# Patient Record
Sex: FEMALE | Race: WHITE | NOT HISPANIC OR LATINO | URBAN - METROPOLITAN AREA
[De-identification: names, ages, dates, MRNs, and addresses within clinical notes are randomized per-mention and may not be internally consistent; named-entity substitution may affect disease eponyms.]

---

## 2020-12-14 ENCOUNTER — OFFICE VISIT (OUTPATIENT)
Dept: URBAN - METROPOLITAN AREA CLINIC 54 | Facility: CLINIC | Age: 70
End: 2020-12-14
Payer: MEDICARE

## 2020-12-14 PROCEDURE — 92134 CPTRZ OPH DX IMG PST SGM RTA: CPT | Performed by: OPHTHALMOLOGY

## 2020-12-14 PROCEDURE — 92012 INTRM OPH EXAM EST PATIENT: CPT | Performed by: OPHTHALMOLOGY

## 2020-12-14 ASSESSMENT — INTRAOCULAR PRESSURE
OS: 17
OD: 17

## 2020-12-14 NOTE — IMPRESSION/PLAN
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 Plan: Last treated with an Avastin injection OU 14 weeks ago. Exam/OCT reveals no IRF/SRF/SRH OU. Recommend continuing treatment to maintain the NV AMD.  Avastin administered OU.   

Return in 10 weeks, OCT OU, re-eval Avastin OU

## 2021-03-08 ENCOUNTER — OFFICE VISIT (OUTPATIENT)
Dept: URBAN - METROPOLITAN AREA CLINIC 54 | Facility: CLINIC | Age: 71
End: 2021-03-08
Payer: MEDICARE

## 2021-03-08 PROCEDURE — 92134 CPTRZ OPH DX IMG PST SGM RTA: CPT | Performed by: OPHTHALMOLOGY

## 2021-03-08 PROCEDURE — 92014 COMPRE OPH EXAM EST PT 1/>: CPT | Performed by: OPHTHALMOLOGY

## 2021-03-08 ASSESSMENT — INTRAOCULAR PRESSURE
OS: 14
OD: 20

## 2021-03-08 NOTE — IMPRESSION/PLAN
Impression: Cataract: H26.9. OD. Right. Plan: Follow up with Dr. Rajan Mcneill for continued monitoring and treatment.

## 2021-03-08 NOTE — IMPRESSION/PLAN
Impression: Exudative age-rel mclr degn, bi, with inact chrdl neovas: H35.0512. Plan: Last treated with an Avastin injection OU 12 weeks ago. Exam/OCT reveals no IRF/SRF/SRH OU. Recommend continuing treatment to maintain the NV AMD.  Avastin administered OU.   

Return in 12 weeks, OCT OU, re-eval Avastin OU

## 2021-05-18 ENCOUNTER — OFFICE VISIT (OUTPATIENT)
Dept: URBAN - METROPOLITAN AREA CLINIC 54 | Facility: CLINIC | Age: 71
End: 2021-05-18
Payer: MEDICARE

## 2021-05-18 DIAGNOSIS — H35.3232 EXUDATIVE AGE-RELATED MACULAR DEGENERATION, BILATERAL, WITH INACTIVE CHOROIDAL NEOVASCULARIZATION: Primary | ICD-10-CM

## 2021-05-18 DIAGNOSIS — H35.3231 EXUDATIVE AGE-RELATED MACULAR DEGENERATION, BILATERAL, WITH ACTIVE CHOROIDAL NEOVASCULARIZATION: ICD-10-CM

## 2021-05-18 PROCEDURE — 92134 CPTRZ OPH DX IMG PST SGM RTA: CPT | Performed by: OPHTHALMOLOGY

## 2021-05-18 ASSESSMENT — INTRAOCULAR PRESSURE
OD: 19
OS: 18

## 2021-05-18 NOTE — IMPRESSION/PLAN
Impression: Cataract: H26.9. OD. Right. Plan: Follow up with Dr. David Sawant for continued monitoring and treatment.

## 2021-05-18 NOTE — IMPRESSION/PLAN
Impression: Exudative age-rel mclr degn, bi, with inact chrdl neovas: H35.3272. Plan: Last treated with an Avastin injection OU 10 weeks ago. Exam/OCT reveals no IRF/SRF/SRH OU. Recommend continuing treatment to maintain the NV AMD.  Avastin administered OU.   

Return in 12 weeks, OCT OU, re-eval Avastin OU

## 2021-08-10 ENCOUNTER — OFFICE VISIT (OUTPATIENT)
Dept: URBAN - METROPOLITAN AREA CLINIC 54 | Facility: CLINIC | Age: 71
End: 2021-08-10
Payer: MEDICARE

## 2021-08-10 DIAGNOSIS — Z96.1 PRESENCE OF PSEUDOPHAKIA: ICD-10-CM

## 2021-08-10 DIAGNOSIS — H26.9 CATARACT: ICD-10-CM

## 2021-08-10 DIAGNOSIS — H26.492 OTHER SECONDARY CATARACT, LEFT EYE: ICD-10-CM

## 2021-08-10 PROCEDURE — 92014 COMPRE OPH EXAM EST PT 1/>: CPT | Performed by: OPHTHALMOLOGY

## 2021-08-10 PROCEDURE — 92134 CPTRZ OPH DX IMG PST SGM RTA: CPT | Performed by: OPHTHALMOLOGY

## 2021-08-10 ASSESSMENT — INTRAOCULAR PRESSURE
OD: 17
OS: 17

## 2021-08-10 NOTE — IMPRESSION/PLAN
Impression: Cataract: H26.9. OD. Right. Plan: Follow up with Dr. Kerri Silva for continued monitoring and treatment.

## 2021-08-10 NOTE — IMPRESSION/PLAN
Impression: Exudative age-rel mclr degn, bi, with inact chrdl neovas: H35.5732. Plan: Last treated with an Avastin injection OU 12 weeks ago. Exam/OCT reveals no IRF/SRF/SRH OU. Recommend continuing treatment to maintain the NV AMD.  Avastin administered OU.   

Return in 12 weeks, OCT OU, re-eval Avastin OU

## 2021-10-26 ENCOUNTER — OFFICE VISIT (OUTPATIENT)
Dept: URBAN - METROPOLITAN AREA CLINIC 54 | Facility: CLINIC | Age: 71
End: 2021-10-26
Payer: MEDICARE

## 2021-10-26 PROCEDURE — 92014 COMPRE OPH EXAM EST PT 1/>: CPT | Performed by: OPHTHALMOLOGY

## 2021-10-26 PROCEDURE — 92134 CPTRZ OPH DX IMG PST SGM RTA: CPT | Performed by: OPHTHALMOLOGY

## 2021-10-26 ASSESSMENT — INTRAOCULAR PRESSURE
OD: 20
OS: 17

## 2021-10-26 NOTE — IMPRESSION/PLAN
Impression: Exudative age-rel mclr degn, bi, with inact chrdl neovas: H35.9176. Plan: Last treated with an Avastin injection OU 11 weeks ago. Exam/OCT reveals recurrent SRF OD and no IRF/SRF/SRH OS. Recommend continuing treatment to maintain the NV AMD.  Avastin administered OU. Will shorten treatment intervals to 6 weeks in OD. Return in 6 weeks, OCT OU, re-eval Avastin OD

## 2021-10-26 NOTE — IMPRESSION/PLAN
Impression: Cataract: H26.9. OD. Right. Plan: Follow up with Dr. Vickye Kehr for continued monitoring and treatment.

## 2021-12-06 ENCOUNTER — OFFICE VISIT (OUTPATIENT)
Dept: URBAN - METROPOLITAN AREA CLINIC 54 | Facility: CLINIC | Age: 71
End: 2021-12-06
Payer: MEDICARE

## 2021-12-06 PROCEDURE — 92134 CPTRZ OPH DX IMG PST SGM RTA: CPT | Performed by: OPHTHALMOLOGY

## 2021-12-06 ASSESSMENT — INTRAOCULAR PRESSURE
OD: 21
OS: 18

## 2021-12-06 NOTE — IMPRESSION/PLAN
Impression: Exudative age-rel mclr degn, bi, with inact chrdl neovas: H35.8002. Plan: Last treated with an Avastin injection OU 6 weeks ago. Exam/OCT reveals decrease SRF OD and no IRF/SRF/SRH OS. Recommend continuing treatment to maintain the NV AMD.  Avastin administered OU. Will extend treatment intervals to 7 weeks in OU. 

Return in 7 weeks, OCT OU, re-eval Avastin OD

## 2022-01-28 ENCOUNTER — OFFICE VISIT (OUTPATIENT)
Dept: URBAN - METROPOLITAN AREA CLINIC 54 | Facility: CLINIC | Age: 72
End: 2022-01-28
Payer: MEDICARE

## 2022-01-28 PROCEDURE — 92014 COMPRE OPH EXAM EST PT 1/>: CPT | Performed by: OPHTHALMOLOGY

## 2022-01-28 PROCEDURE — 92134 CPTRZ OPH DX IMG PST SGM RTA: CPT | Performed by: OPHTHALMOLOGY

## 2022-01-28 ASSESSMENT — INTRAOCULAR PRESSURE
OS: 18
OD: 21

## 2022-01-28 NOTE — IMPRESSION/PLAN
Impression: Exudative age-rel mclr degn, bi, with inact chrdl neovas: H35.0914. Plan: Last treated with an Avastin injection OU 7 weeks ago on 12/6/21. Exam/OCT reveals no IRF/SRF/SRH OU. Recommend continuing treatment to maintain the NV AMD.  Avastin administered OU. Will extend intervals to 8 weeks due to scheduling. 

Return in 8 weeks, OCT OU, re-eval Avastin OD

## 2022-03-25 ENCOUNTER — OFFICE VISIT (OUTPATIENT)
Dept: URBAN - METROPOLITAN AREA CLINIC 54 | Facility: CLINIC | Age: 72
End: 2022-03-25
Payer: MEDICARE

## 2022-03-25 PROCEDURE — 92134 CPTRZ OPH DX IMG PST SGM RTA: CPT | Performed by: OPHTHALMOLOGY

## 2022-03-25 ASSESSMENT — INTRAOCULAR PRESSURE
OS: 15
OD: 17

## 2022-03-25 NOTE — IMPRESSION/PLAN
Impression: Exudative age-rel mclr degn, bi, with inact chrdl neovas: H35.1832. Plan: Last treated with an Avastin injection OU 8 weeks ago on 1/28/22. Exam/OCT reveals decrease SRF OD and no IRF/SRF/SRH OS. Recommend continuing treatment to maintain the NV AMD.  Avastin administered OU.   

Return in 8 weeks, OCT OU, re-eval Avastin OU

## 2022-05-13 ENCOUNTER — OFFICE VISIT (OUTPATIENT)
Dept: URBAN - METROPOLITAN AREA CLINIC 54 | Facility: CLINIC | Age: 72
End: 2022-05-13
Payer: MEDICARE

## 2022-05-13 DIAGNOSIS — Z96.1 PRESENCE OF PSEUDOPHAKIA: ICD-10-CM

## 2022-05-13 DIAGNOSIS — H35.3232 EXUDATIVE AGE-RELATED MACULAR DEGENERATION, BILATERAL, WITH INACTIVE CHOROIDAL NEOVASCULARIZATION: Primary | ICD-10-CM

## 2022-05-13 PROCEDURE — 92134 CPTRZ OPH DX IMG PST SGM RTA: CPT | Performed by: OPHTHALMOLOGY

## 2022-05-13 PROCEDURE — 92014 COMPRE OPH EXAM EST PT 1/>: CPT | Performed by: OPHTHALMOLOGY

## 2022-05-13 ASSESSMENT — INTRAOCULAR PRESSURE
OS: 21
OD: 23

## 2022-05-13 NOTE — IMPRESSION/PLAN
Impression: Exudative age-rel mclr degn, bi, with inact chrdl neovas: H35.3072. Plan: Last treated with an Avastin injection OU 7 weeks ago on 3/25/22. Exam/OCT reveals no IRF/SRF/SRH OU. Recommend continuing treatment to maintain the NV AMD.  Avastin administered OU.   

Return in 8 weeks, OCT OU, re-eval Avastin OU

## 2022-07-06 ENCOUNTER — OFFICE VISIT (OUTPATIENT)
Dept: URBAN - METROPOLITAN AREA CLINIC 13 | Facility: CLINIC | Age: 72
End: 2022-07-06
Payer: MEDICARE

## 2022-07-06 DIAGNOSIS — H35.3232 EXUDATIVE AGE-RELATED MACULAR DEGENERATION, BILATERAL, WITH INACTIVE CHOROIDAL NEOVASCULARIZATION: Primary | ICD-10-CM

## 2022-07-06 DIAGNOSIS — Z96.1 PRESENCE OF PSEUDOPHAKIA: ICD-10-CM

## 2022-07-06 PROCEDURE — 99213 OFFICE O/P EST LOW 20 MIN: CPT | Performed by: OPHTHALMOLOGY

## 2022-07-06 PROCEDURE — 92134 CPTRZ OPH DX IMG PST SGM RTA: CPT | Performed by: OPHTHALMOLOGY

## 2022-07-06 ASSESSMENT — INTRAOCULAR PRESSURE
OS: 13
OD: 13

## 2022-07-06 NOTE — IMPRESSION/PLAN
Impression: Exudative age-rel mclr degn, bi, with inact chrdl neovas: H35.3992. Plan: Last treated with an Avastin injection OU 7 1/2 weeks ago on 5/13/22. Exam/OCT reveals no IRF/SRF/SRH OU. Recommend continuing treatment to maintain the NV AMD.  Avastin administered OU.   

Return in 8 weeks, OCT OU, re-eval Avastin OU

## 2022-08-31 ENCOUNTER — OFFICE VISIT (OUTPATIENT)
Dept: URBAN - METROPOLITAN AREA CLINIC 13 | Facility: CLINIC | Age: 72
End: 2022-08-31
Payer: MEDICARE

## 2022-08-31 DIAGNOSIS — Z96.1 PRESENCE OF PSEUDOPHAKIA: ICD-10-CM

## 2022-08-31 DIAGNOSIS — H26.9 CATARACT: ICD-10-CM

## 2022-08-31 DIAGNOSIS — H35.3232 EXUDATIVE AGE-RELATED MACULAR DEGENERATION, BILATERAL, WITH INACTIVE CHOROIDAL NEOVASCULARIZATION: Primary | ICD-10-CM

## 2022-08-31 PROCEDURE — 92134 CPTRZ OPH DX IMG PST SGM RTA: CPT | Performed by: OPHTHALMOLOGY

## 2022-08-31 PROCEDURE — 92014 COMPRE OPH EXAM EST PT 1/>: CPT | Performed by: OPHTHALMOLOGY

## 2022-08-31 ASSESSMENT — INTRAOCULAR PRESSURE
OS: 18
OD: 20

## 2022-08-31 NOTE — IMPRESSION/PLAN
Impression: Cataract: H26.9. Plan: No treatment is recommended at this time. Will continue to monitor. Non-visually significant. Observe.

## 2022-08-31 NOTE — IMPRESSION/PLAN
Impression: Exudative age-rel mclr degn, bi, with inact chrdl neovas: H35.3232. Plan: Last treated with an Avastin injection OU 8 weeks ago on 07/06/22. Exam/OCT reveals no IRF/SRF/SRH OU. Condition remains stable OU. Recommend continuing treatment to maintain the NV AMD.  Avastin administered OU.   

Return in 8 weeks, OCT OU, re-eval Avastin OU (8/31/22)

## 2022-10-26 ENCOUNTER — OFFICE VISIT (OUTPATIENT)
Dept: URBAN - METROPOLITAN AREA CLINIC 13 | Facility: CLINIC | Age: 72
End: 2022-10-26
Payer: MEDICARE

## 2022-10-26 DIAGNOSIS — H35.3232 EXUDATIVE AGE-RELATED MACULAR DEGENERATION, BILATERAL, WITH INACTIVE CHOROIDAL NEOVASCULARIZATION: Primary | ICD-10-CM

## 2022-10-26 DIAGNOSIS — H26.9 CATARACT: ICD-10-CM

## 2022-10-26 DIAGNOSIS — Z96.1 PRESENCE OF PSEUDOPHAKIA: ICD-10-CM

## 2022-10-26 PROCEDURE — 92134 CPTRZ OPH DX IMG PST SGM RTA: CPT | Performed by: OPHTHALMOLOGY

## 2022-10-26 ASSESSMENT — INTRAOCULAR PRESSURE
OS: 17
OD: 19

## 2022-10-26 NOTE — IMPRESSION/PLAN
Impression: Exudative age-rel mclr degn, bi, with inact chrdl neovas: H35.2902. Plan: Last treated with an Avastin injection OU 8 weeks ago on 8/31/22. Exam/OCT reveals no IRF/SRF/SRH OU. Condition remains stable OU. Recommend continuing treatment to maintain the NV AMD.  Avastin administered OU.   

Return in 8 weeks, OCT OU, re-eval Avastin OU (8/31/22)

## 2023-01-10 ENCOUNTER — OFFICE VISIT (OUTPATIENT)
Dept: URBAN - METROPOLITAN AREA CLINIC 54 | Facility: CLINIC | Age: 73
End: 2023-01-10
Payer: MEDICARE

## 2023-01-10 DIAGNOSIS — H35.3232 EXUDATIVE AGE-RELATED MACULAR DEGENERATION, BILATERAL, WITH INACTIVE CHOROIDAL NEOVASCULARIZATION: Primary | ICD-10-CM

## 2023-01-10 DIAGNOSIS — H26.9 CATARACT: ICD-10-CM

## 2023-01-10 DIAGNOSIS — Z96.1 PRESENCE OF PSEUDOPHAKIA: ICD-10-CM

## 2023-01-10 PROCEDURE — 92134 CPTRZ OPH DX IMG PST SGM RTA: CPT | Performed by: OPHTHALMOLOGY

## 2023-01-10 PROCEDURE — 92014 COMPRE OPH EXAM EST PT 1/>: CPT | Performed by: OPHTHALMOLOGY

## 2023-01-10 ASSESSMENT — INTRAOCULAR PRESSURE
OD: 20
OS: 15

## 2023-01-10 NOTE — IMPRESSION/PLAN
Impression: Exudative age-rel mclr degn, bi, with inact chrdl neovas: H35.5352. Plan: Last treated with an Avastin injection OU 11 weeks ago on 10/26/22. Exam/OCT reveals no IRF/SRF/SRH OU. Condition remains stable OU. Recommend continuing treatment to maintain the NV AMD.  Avastin administered OU.   

Return in 8 weeks, OCT OU, re-eval Avastin OU (8/31/22)

## 2023-01-10 NOTE — IMPRESSION/PLAN
Impression: Cataract: H26.9. Right. Plan: No treatment is recommended at this time. Will continue to monitor. Non-visually significant. Observe.

## 2023-03-06 ENCOUNTER — OFFICE VISIT (OUTPATIENT)
Dept: URBAN - METROPOLITAN AREA CLINIC 54 | Facility: CLINIC | Age: 73
End: 2023-03-06
Payer: MEDICARE

## 2023-03-06 DIAGNOSIS — H26.9 CATARACT: ICD-10-CM

## 2023-03-06 DIAGNOSIS — H35.3232 EXUDATIVE AGE-RELATED MACULAR DEGENERATION, BILATERAL, WITH INACTIVE CHOROIDAL NEOVASCULARIZATION: Primary | ICD-10-CM

## 2023-03-06 DIAGNOSIS — Z96.1 PRESENCE OF PSEUDOPHAKIA: ICD-10-CM

## 2023-03-06 PROCEDURE — 92134 CPTRZ OPH DX IMG PST SGM RTA: CPT | Performed by: OPHTHALMOLOGY

## 2023-03-06 PROCEDURE — 99214 OFFICE O/P EST MOD 30 MIN: CPT | Performed by: OPHTHALMOLOGY

## 2023-03-06 ASSESSMENT — INTRAOCULAR PRESSURE
OD: 21
OS: 21

## 2023-03-06 NOTE — IMPRESSION/PLAN
Impression: Exudative age-rel mclr degn, bi, with inact chrdl neovas: H35.6842. Plan: Last treated with an Avastin injection OU 8 weeks ago on 1/10/23. Exam/OCT reveals no IRF/SRF/SRH OU. Condition remains stable OU. Recommend continuing treatment to maintain the NV AMD.  Avastin administered OU.   

Return in 8 weeks, OCT OU, re-eval Avastin OU (8/31/22)

## 2023-05-01 ENCOUNTER — OFFICE VISIT (OUTPATIENT)
Dept: URBAN - METROPOLITAN AREA CLINIC 54 | Facility: CLINIC | Age: 73
End: 2023-05-01
Payer: MEDICARE

## 2023-05-01 DIAGNOSIS — H26.9 CATARACT: ICD-10-CM

## 2023-05-01 DIAGNOSIS — H35.3232 EXUDATIVE AGE-RELATED MACULAR DEGENERATION, BILATERAL, WITH INACTIVE CHOROIDAL NEOVASCULARIZATION: Primary | ICD-10-CM

## 2023-05-01 DIAGNOSIS — Z96.1 PRESENCE OF PSEUDOPHAKIA: ICD-10-CM

## 2023-05-01 DIAGNOSIS — H35.3114 NEXDTVE AGE-REL MCLR DEGN, R EYE, ADV ATRPC WITH SBFVL INVL: ICD-10-CM

## 2023-05-01 PROCEDURE — 92134 CPTRZ OPH DX IMG PST SGM RTA: CPT | Performed by: OPHTHALMOLOGY

## 2023-05-01 PROCEDURE — 99213 OFFICE O/P EST LOW 20 MIN: CPT | Performed by: OPHTHALMOLOGY

## 2023-05-01 ASSESSMENT — INTRAOCULAR PRESSURE
OD: 18
OS: 15

## 2023-05-01 NOTE — IMPRESSION/PLAN
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 Discussed treatment with Syfovre including risks and benefits of treatment. Patient elects to proceed with treatment. Will schedule Syfovre treatments every 8 weeks in between treatments for the NV AMD OD.

## 2023-05-01 NOTE — IMPRESSION/PLAN
Impression: Cataract: H26.9. Right. Plan: No treatment is recommended at this time. Will continue to monitor.

## 2023-05-01 NOTE — IMPRESSION/PLAN
Impression: Exudative age-rel mclr degn, bi, with inact chrdl neovas: H35.3232. Plan: Last treated with an Avastin injection OU 8 weeks ago on 3/6/23. Exam/OCT reveals no IRF/SRF/SRH OU. Condition remains stable OU. Recommend continuing treatment to maintain the NV AMD.  Avastin administered OU. Return in 4 weeks, FAF OU, OCT OU, re-eval Syfovre OU Return in 8 weeks, OCT OU, re-eval Avastin OU

## 2023-06-06 ENCOUNTER — OFFICE VISIT (OUTPATIENT)
Dept: URBAN - METROPOLITAN AREA CLINIC 54 | Facility: CLINIC | Age: 73
End: 2023-06-06
Payer: MEDICARE

## 2023-06-06 DIAGNOSIS — H35.3232 EXUDATIVE AGE-REL MCLR DEGN, BI, WITH INACT CHRDL NEOVAS: ICD-10-CM

## 2023-06-06 DIAGNOSIS — Z96.1 PRESENCE OF PSEUDOPHAKIA: ICD-10-CM

## 2023-06-06 DIAGNOSIS — H35.3114 NONEXUDATIVE AGE-RELATED MACULAR DEGENERATION, RIGHT EYE, ADVANCED ATROPHIC WITH SUBFOVEAL INVOLVEMENT: Primary | ICD-10-CM

## 2023-06-06 PROCEDURE — 67028 INJECTION EYE DRUG: CPT | Performed by: OPHTHALMOLOGY

## 2023-06-06 PROCEDURE — 92134 CPTRZ OPH DX IMG PST SGM RTA: CPT | Performed by: OPHTHALMOLOGY

## 2023-06-06 ASSESSMENT — INTRAOCULAR PRESSURE
OS: 21
OD: 28

## 2023-06-06 NOTE — IMPRESSION/PLAN
Impression: Nexdtve age-rel mclr degn, r eye, adv atrpc with sbfvl invl: [id number]. Right. 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 Discussed treatment with Syfovre including risks and benefits of treatment. Patient elects to proceed with treatment. Will schedule Syfovre treatments every 8 weeks in between treatments for the NV AMD OD. Syfovre administered OD. Return in 3-4 weeks, OCT OU, Avastin OU

## 2023-06-06 NOTE — IMPRESSION/PLAN
Impression: Exudative age-rel mclr degn, bi, with inact chrdl neovas: H35.3232. Plan: Last treated with an Avastin injection OU 5 weeks ago on 5/1/23. Exam/OCT reveals no IRF/SRF/SRH OU. Condition remains stable OU. Recommend continuing treatment to maintain the NV AMD. Return as scheduled for next Avastin treatment.

## 2023-06-26 ENCOUNTER — PROCEDURE (OUTPATIENT)
Dept: URBAN - METROPOLITAN AREA CLINIC 7 | Facility: CLINIC | Age: 73
End: 2023-06-26
Payer: MEDICARE

## 2023-06-26 DIAGNOSIS — H35.3232 EXUDATIVE AGE-RELATED MACULAR DEGENERATION, BILATERAL, WITH INACTIVE CHOROIDAL NEOVASCULARIZATION: Primary | ICD-10-CM

## 2023-06-26 PROCEDURE — 92134 CPTRZ OPH DX IMG PST SGM RTA: CPT | Performed by: OPHTHALMOLOGY

## 2023-06-26 ASSESSMENT — INTRAOCULAR PRESSURE
OD: 21
OS: 19

## 2023-07-25 ENCOUNTER — OFFICE VISIT (OUTPATIENT)
Dept: URBAN - METROPOLITAN AREA CLINIC 54 | Facility: CLINIC | Age: 73
End: 2023-07-25
Payer: MEDICARE

## 2023-07-25 DIAGNOSIS — H35.3114 NEXDTVE AGE-REL MCLR DEGN, R EYE, ADV ATRPC WITH SBFVL INVL: Primary | ICD-10-CM

## 2023-07-25 DIAGNOSIS — Z96.1 PRESENCE OF PSEUDOPHAKIA: ICD-10-CM

## 2023-07-25 DIAGNOSIS — H26.9 CATARACT: ICD-10-CM

## 2023-07-25 DIAGNOSIS — H16.129 FILAMENTARY KERATITIS OF EYE: ICD-10-CM

## 2023-07-25 DIAGNOSIS — H35.3232 EXUDATIVE AGE-RELATED MACULAR DEGENERATION, BILATERAL, WITH INACTIVE CHOROIDAL NEOVASCULARIZATION: ICD-10-CM

## 2023-07-25 PROCEDURE — 65222 REMOVE FOREIGN BODY FROM EYE: CPT | Performed by: OPHTHALMOLOGY

## 2023-07-25 PROCEDURE — 99213 OFFICE O/P EST LOW 20 MIN: CPT | Performed by: OPHTHALMOLOGY

## 2023-07-25 PROCEDURE — 92134 CPTRZ OPH DX IMG PST SGM RTA: CPT | Performed by: OPHTHALMOLOGY

## 2023-07-25 ASSESSMENT — INTRAOCULAR PRESSURE
OD: 14
OS: 12

## 2023-08-23 ENCOUNTER — PROCEDURE (OUTPATIENT)
Dept: URBAN - METROPOLITAN AREA CLINIC 13 | Facility: CLINIC | Age: 73
End: 2023-08-23
Payer: MEDICARE

## 2023-08-23 DIAGNOSIS — H35.3232 EXUDATIVE AGE-RELATED MACULAR DEGENERATION, BILATERAL, WITH INACTIVE CHOROIDAL NEOVASCULARIZATION: Primary | ICD-10-CM

## 2023-08-23 PROCEDURE — 92134 CPTRZ OPH DX IMG PST SGM RTA: CPT | Performed by: OPHTHALMOLOGY

## 2023-08-23 ASSESSMENT — INTRAOCULAR PRESSURE
OS: 18
OD: 20

## 2023-10-18 ENCOUNTER — OFFICE VISIT (OUTPATIENT)
Dept: URBAN - METROPOLITAN AREA CLINIC 13 | Facility: CLINIC | Age: 73
End: 2023-10-18
Payer: MEDICARE

## 2023-10-18 DIAGNOSIS — H35.3232 EXUDATIVE AGE-RELATED MACULAR DEGENERATION, BILATERAL, WITH INACTIVE CHOROIDAL NEOVASCULARIZATION: Primary | ICD-10-CM

## 2023-10-18 PROCEDURE — 92134 CPTRZ OPH DX IMG PST SGM RTA: CPT | Performed by: OPHTHALMOLOGY

## 2023-10-18 ASSESSMENT — INTRAOCULAR PRESSURE
OD: 19
OS: 17

## 2023-12-07 ENCOUNTER — OFFICE VISIT (OUTPATIENT)
Dept: URBAN - METROPOLITAN AREA CLINIC 13 | Facility: CLINIC | Age: 73
End: 2023-12-07
Payer: MEDICARE

## 2023-12-07 DIAGNOSIS — H35.3232 EXUDATIVE AGE-RELATED MACULAR DEGENERATION, BILATERAL, WITH INACTIVE CHOROIDAL NEOVASCULARIZATION: Primary | ICD-10-CM

## 2023-12-07 PROCEDURE — 92134 CPTRZ OPH DX IMG PST SGM RTA: CPT | Performed by: OPHTHALMOLOGY

## 2023-12-07 ASSESSMENT — INTRAOCULAR PRESSURE
OS: 26
OD: 24

## 2024-02-01 ENCOUNTER — OFFICE VISIT (OUTPATIENT)
Dept: URBAN - METROPOLITAN AREA CLINIC 13 | Facility: CLINIC | Age: 74
End: 2024-02-01
Payer: MEDICARE

## 2024-02-01 PROCEDURE — 92134 CPTRZ OPH DX IMG PST SGM RTA: CPT | Performed by: OPHTHALMOLOGY

## 2024-02-01 ASSESSMENT — INTRAOCULAR PRESSURE
OD: 18
OS: 15

## 2024-03-28 ENCOUNTER — OFFICE VISIT (OUTPATIENT)
Dept: URBAN - METROPOLITAN AREA CLINIC 13 | Facility: CLINIC | Age: 74
End: 2024-03-28
Payer: MEDICARE

## 2024-03-28 DIAGNOSIS — H35.3232 EXUDATIVE AGE-RELATED MACULAR DEGENERATION, BILATERAL, WITH INACTIVE CHOROIDAL NEOVASCULARIZATION: Primary | ICD-10-CM

## 2024-03-28 ASSESSMENT — INTRAOCULAR PRESSURE
OD: 15
OS: 13

## 2024-05-23 ENCOUNTER — OFFICE VISIT (OUTPATIENT)
Dept: URBAN - METROPOLITAN AREA CLINIC 13 | Facility: CLINIC | Age: 74
End: 2024-05-23
Payer: MEDICARE

## 2024-05-23 DIAGNOSIS — H35.3232 EXUDATIVE AGE-RELATED MACULAR DEGENERATION, BILATERAL, WITH INACTIVE CHOROIDAL NEOVASCULARIZATION: Primary | ICD-10-CM

## 2024-05-23 PROCEDURE — 92134 CPTRZ OPH DX IMG PST SGM RTA: CPT | Performed by: OPHTHALMOLOGY

## 2024-05-23 RX ORDER — SPIRONOLACTONE 25 MG/1
25 MG TABLET, FILM COATED ORAL
Qty: 0 | Refills: 0 | Status: ACTIVE
Start: 2024-05-23

## 2024-05-23 RX ORDER — METOPROLOL SUCCINATE 50 MG/1
50 MG TABLET, FILM COATED, EXTENDED RELEASE ORAL
Qty: 0 | Refills: 0 | Status: ACTIVE
Start: 2024-05-23

## 2024-05-23 RX ORDER — MIDODRINE HYDROCHLORIDE 5 MG/1
5 MG TABLET ORAL
Qty: 0 | Refills: 0 | Status: ACTIVE
Start: 2024-05-23

## 2024-05-23 RX ORDER — BUMETANIDE 2 MG/1
2 MG TABLET ORAL
Qty: 0 | Refills: 0 | Status: ACTIVE
Start: 2024-05-23

## 2024-05-23 ASSESSMENT — INTRAOCULAR PRESSURE
OS: 17
OD: 16

## 2024-08-21 ENCOUNTER — OFFICE VISIT (OUTPATIENT)
Dept: URBAN - METROPOLITAN AREA CLINIC 13 | Facility: CLINIC | Age: 74
End: 2024-08-21
Payer: MEDICARE

## 2024-08-21 DIAGNOSIS — H35.3232 EXUDATIVE AGE-RELATED MACULAR DEGENERATION, BILATERAL, WITH INACTIVE CHOROIDAL NEOVASCULARIZATION: Primary | ICD-10-CM

## 2024-08-21 PROCEDURE — 92134 CPTRZ OPH DX IMG PST SGM RTA: CPT | Performed by: OPHTHALMOLOGY

## 2024-08-21 ASSESSMENT — INTRAOCULAR PRESSURE
OS: 20
OD: 17